# Patient Record
Sex: FEMALE | Race: OTHER | HISPANIC OR LATINO
[De-identification: names, ages, dates, MRNs, and addresses within clinical notes are randomized per-mention and may not be internally consistent; named-entity substitution may affect disease eponyms.]

---

## 2023-10-23 ENCOUNTER — LABORATORY RESULT (OUTPATIENT)
Age: 28
End: 2023-10-23

## 2023-10-24 ENCOUNTER — ASOB RESULT (OUTPATIENT)
Age: 28
End: 2023-10-24

## 2023-10-24 ENCOUNTER — APPOINTMENT (OUTPATIENT)
Dept: ANTEPARTUM | Facility: CLINIC | Age: 28
End: 2023-10-24
Payer: MEDICAID

## 2023-10-24 PROBLEM — Z00.00 ENCOUNTER FOR PREVENTIVE HEALTH EXAMINATION: Status: ACTIVE | Noted: 2023-10-24

## 2023-10-24 PROCEDURE — 76813 OB US NUCHAL MEAS 1 GEST: CPT

## 2023-10-24 PROCEDURE — 76801 OB US < 14 WKS SINGLE FETUS: CPT | Mod: 59

## 2023-11-07 RX ORDER — PNV/FERROUS SULFATE/FOLIC ACID 27-<0.5MG
TABLET ORAL
Refills: 0 | Status: ACTIVE | COMMUNITY

## 2023-11-07 RX ORDER — LABETALOL HYDROCHLORIDE 100 MG/1
100 TABLET, FILM COATED ORAL
Refills: 0 | Status: ACTIVE | COMMUNITY

## 2023-11-13 ENCOUNTER — APPOINTMENT (OUTPATIENT)
Dept: CARDIOLOGY | Facility: CLINIC | Age: 28
End: 2023-11-13
Payer: MEDICAID

## 2023-11-13 VITALS
OXYGEN SATURATION: 99 % | WEIGHT: 232 LBS | RESPIRATION RATE: 16 BRPM | HEART RATE: 100 BPM | SYSTOLIC BLOOD PRESSURE: 125 MMHG | DIASTOLIC BLOOD PRESSURE: 84 MMHG | TEMPERATURE: 98.1 F

## 2023-11-13 PROCEDURE — 93000 ELECTROCARDIOGRAM COMPLETE: CPT

## 2023-11-13 PROCEDURE — 99204 OFFICE O/P NEW MOD 45 MIN: CPT | Mod: 25

## 2023-11-13 RX ORDER — ASPIRIN ENTERIC COATED TABLETS 81 MG 81 MG/1
81 TABLET, DELAYED RELEASE ORAL
Qty: 90 | Refills: 3 | Status: ACTIVE | COMMUNITY
Start: 2023-11-13 | End: 1900-01-01

## 2023-12-13 ENCOUNTER — APPOINTMENT (OUTPATIENT)
Dept: ANTEPARTUM | Facility: CLINIC | Age: 28
End: 2023-12-13
Payer: MEDICAID

## 2023-12-13 ENCOUNTER — ASOB RESULT (OUTPATIENT)
Age: 28
End: 2023-12-13

## 2023-12-13 PROCEDURE — 76811 OB US DETAILED SNGL FETUS: CPT

## 2023-12-27 ENCOUNTER — ASOB RESULT (OUTPATIENT)
Age: 28
End: 2023-12-27

## 2023-12-27 ENCOUNTER — APPOINTMENT (OUTPATIENT)
Dept: ANTEPARTUM | Facility: CLINIC | Age: 28
End: 2023-12-27
Payer: MEDICAID

## 2023-12-27 PROCEDURE — 76816 OB US FOLLOW-UP PER FETUS: CPT

## 2024-01-29 ENCOUNTER — NON-APPOINTMENT (OUTPATIENT)
Age: 29
End: 2024-01-29

## 2024-01-29 ENCOUNTER — APPOINTMENT (OUTPATIENT)
Dept: CARDIOLOGY | Facility: CLINIC | Age: 29
End: 2024-01-29
Payer: MEDICAID

## 2024-01-29 VITALS
TEMPERATURE: 98 F | DIASTOLIC BLOOD PRESSURE: 89 MMHG | BODY MASS INDEX: 38.65 KG/M2 | HEIGHT: 65 IN | SYSTOLIC BLOOD PRESSURE: 136 MMHG | OXYGEN SATURATION: 95 % | WEIGHT: 232 LBS | HEART RATE: 102 BPM

## 2024-01-29 DIAGNOSIS — I10 ESSENTIAL (PRIMARY) HYPERTENSION: ICD-10-CM

## 2024-01-29 PROCEDURE — 93000 ELECTROCARDIOGRAM COMPLETE: CPT

## 2024-01-29 PROCEDURE — 99214 OFFICE O/P EST MOD 30 MIN: CPT | Mod: 25

## 2024-01-29 NOTE — DISCUSSION/SUMMARY
[FreeTextEntry1] : 28 year old woman  who is ~328 weeks gestation. (ARLYN 5/3/24) Continue Labetalol 100 mg BID Continue ASA Check TTE   [EKG obtained to assist in diagnosis and management of assessed problem(s)] : EKG obtained to assist in diagnosis and management of assessed problem(s)

## 2024-01-29 NOTE — HISTORY OF PRESENT ILLNESS
[FreeTextEntry1] : 28 year old woman  who is ~28 weeks gestation. (ARLYN 5/3/24)  She carries a history of HTN  She is being treated with Labetalol 100 mg BID  Yulissa [id number] ID

## 2024-02-07 ENCOUNTER — OUTPATIENT (OUTPATIENT)
Dept: OUTPATIENT SERVICES | Facility: HOSPITAL | Age: 29
LOS: 1 days | End: 2024-02-07
Payer: MEDICAID

## 2024-02-07 ENCOUNTER — APPOINTMENT (OUTPATIENT)
Dept: CV DIAGNOSITCS | Facility: HOSPITAL | Age: 29
End: 2024-02-07

## 2024-02-07 DIAGNOSIS — I10 ESSENTIAL (PRIMARY) HYPERTENSION: ICD-10-CM

## 2024-02-07 PROCEDURE — 93306 TTE W/DOPPLER COMPLETE: CPT | Mod: 26

## 2024-02-09 ENCOUNTER — APPOINTMENT (OUTPATIENT)
Dept: ANTEPARTUM | Facility: CLINIC | Age: 29
End: 2024-02-09
Payer: MEDICAID

## 2024-02-09 ENCOUNTER — ASOB RESULT (OUTPATIENT)
Age: 29
End: 2024-02-09

## 2024-02-09 PROCEDURE — 76816 OB US FOLLOW-UP PER FETUS: CPT

## 2024-02-09 PROCEDURE — 76819 FETAL BIOPHYS PROFIL W/O NST: CPT | Mod: 59

## 2024-02-13 PROCEDURE — 93010 ELECTROCARDIOGRAM REPORT: CPT

## 2024-03-08 ENCOUNTER — APPOINTMENT (OUTPATIENT)
Dept: ANTEPARTUM | Facility: CLINIC | Age: 29
End: 2024-03-08

## 2024-03-25 ENCOUNTER — APPOINTMENT (OUTPATIENT)
Dept: CARDIOLOGY | Facility: CLINIC | Age: 29
End: 2024-03-25

## 2024-03-25 ENCOUNTER — APPOINTMENT (OUTPATIENT)
Dept: ANTEPARTUM | Facility: CLINIC | Age: 29
End: 2024-03-25

## 2024-03-27 ENCOUNTER — APPOINTMENT (OUTPATIENT)
Dept: ANTEPARTUM | Facility: CLINIC | Age: 29
End: 2024-03-27
Payer: MEDICAID

## 2024-03-27 ENCOUNTER — ASOB RESULT (OUTPATIENT)
Age: 29
End: 2024-03-27

## 2024-03-27 PROCEDURE — 76819 FETAL BIOPHYS PROFIL W/O NST: CPT

## 2024-03-27 PROCEDURE — 76816 OB US FOLLOW-UP PER FETUS: CPT

## 2024-04-11 ENCOUNTER — APPOINTMENT (OUTPATIENT)
Dept: CV DIAGNOSITCS | Facility: HOSPITAL | Age: 29
End: 2024-04-11

## 2024-04-17 ENCOUNTER — APPOINTMENT (OUTPATIENT)
Dept: ANTEPARTUM | Facility: CLINIC | Age: 29
End: 2024-04-17

## 2024-04-17 ENCOUNTER — INPATIENT (INPATIENT)
Facility: HOSPITAL | Age: 29
LOS: 5 days | Discharge: HOME | End: 2024-04-23
Attending: STUDENT IN AN ORGANIZED HEALTH CARE EDUCATION/TRAINING PROGRAM | Admitting: STUDENT IN AN ORGANIZED HEALTH CARE EDUCATION/TRAINING PROGRAM
Payer: MEDICAID

## 2024-04-17 VITALS
WEIGHT: 266.1 LBS | RESPIRATION RATE: 16 BRPM | HEART RATE: 98 BPM | DIASTOLIC BLOOD PRESSURE: 68 MMHG | HEIGHT: 63 IN | SYSTOLIC BLOOD PRESSURE: 117 MMHG | TEMPERATURE: 98 F

## 2024-04-17 DIAGNOSIS — Z90.49 ACQUIRED ABSENCE OF OTHER SPECIFIED PARTS OF DIGESTIVE TRACT: Chronic | ICD-10-CM

## 2024-04-17 DIAGNOSIS — O26.899 OTHER SPECIFIED PREGNANCY RELATED CONDITIONS, UNSPECIFIED TRIMESTER: ICD-10-CM

## 2024-04-17 LAB
ALBUMIN SERPL ELPH-MCNC: 3.7 G/DL — SIGNIFICANT CHANGE UP (ref 3.3–5)
ALP SERPL-CCNC: 153 U/L — HIGH (ref 40–120)
ALT FLD-CCNC: 27 U/L — SIGNIFICANT CHANGE UP (ref 4–33)
ANION GAP SERPL CALC-SCNC: 14 MMOL/L — SIGNIFICANT CHANGE UP (ref 7–14)
APPEARANCE UR: ABNORMAL
AST SERPL-CCNC: 23 U/L — SIGNIFICANT CHANGE UP (ref 4–32)
BACTERIA # UR AUTO: ABNORMAL /HPF
BASOPHILS # BLD AUTO: 0.02 K/UL — SIGNIFICANT CHANGE UP (ref 0–0.2)
BASOPHILS NFR BLD AUTO: 0.3 % — SIGNIFICANT CHANGE UP (ref 0–2)
BILIRUB SERPL-MCNC: 0.4 MG/DL — SIGNIFICANT CHANGE UP (ref 0.2–1.2)
BILIRUB UR-MCNC: NEGATIVE — SIGNIFICANT CHANGE UP
BLD GP AB SCN SERPL QL: NEGATIVE — SIGNIFICANT CHANGE UP
BUN SERPL-MCNC: 7 MG/DL — SIGNIFICANT CHANGE UP (ref 7–23)
CALCIUM SERPL-MCNC: 8.9 MG/DL — SIGNIFICANT CHANGE UP (ref 8.4–10.5)
CAST: 2 /LPF — SIGNIFICANT CHANGE UP (ref 0–4)
CHLORIDE SERPL-SCNC: 105 MMOL/L — SIGNIFICANT CHANGE UP (ref 98–107)
CO2 SERPL-SCNC: 18 MMOL/L — LOW (ref 22–31)
COLOR SPEC: YELLOW — SIGNIFICANT CHANGE UP
CREAT ?TM UR-MCNC: 116 MG/DL — SIGNIFICANT CHANGE UP
CREAT SERPL-MCNC: 0.47 MG/DL — LOW (ref 0.5–1.3)
DIFF PNL FLD: NEGATIVE — SIGNIFICANT CHANGE UP
EGFR: 132 ML/MIN/1.73M2 — SIGNIFICANT CHANGE UP
EOSINOPHIL # BLD AUTO: 0.08 K/UL — SIGNIFICANT CHANGE UP (ref 0–0.5)
EOSINOPHIL NFR BLD AUTO: 1.1 % — SIGNIFICANT CHANGE UP (ref 0–6)
GLUCOSE SERPL-MCNC: 69 MG/DL — LOW (ref 70–99)
GLUCOSE UR QL: NEGATIVE MG/DL — SIGNIFICANT CHANGE UP
HCT VFR BLD CALC: 33.9 % — LOW (ref 34.5–45)
HGB BLD-MCNC: 11.4 G/DL — LOW (ref 11.5–15.5)
IANC: 4.76 K/UL — SIGNIFICANT CHANGE UP (ref 1.8–7.4)
IMM GRANULOCYTES NFR BLD AUTO: 1.4 % — HIGH (ref 0–0.9)
KETONES UR-MCNC: 15 MG/DL
LDH SERPL L TO P-CCNC: 206 U/L — SIGNIFICANT CHANGE UP (ref 135–225)
LEUKOCYTE ESTERASE UR-ACNC: ABNORMAL
LYMPHOCYTES # BLD AUTO: 1.69 K/UL — SIGNIFICANT CHANGE UP (ref 1–3.3)
LYMPHOCYTES # BLD AUTO: 23.4 % — SIGNIFICANT CHANGE UP (ref 13–44)
MCHC RBC-ENTMCNC: 31.5 PG — SIGNIFICANT CHANGE UP (ref 27–34)
MCHC RBC-ENTMCNC: 33.6 GM/DL — SIGNIFICANT CHANGE UP (ref 32–36)
MCV RBC AUTO: 93.6 FL — SIGNIFICANT CHANGE UP (ref 80–100)
MONOCYTES # BLD AUTO: 0.56 K/UL — SIGNIFICANT CHANGE UP (ref 0–0.9)
MONOCYTES NFR BLD AUTO: 7.8 % — SIGNIFICANT CHANGE UP (ref 2–14)
NEUTROPHILS # BLD AUTO: 4.76 K/UL — SIGNIFICANT CHANGE UP (ref 1.8–7.4)
NEUTROPHILS NFR BLD AUTO: 66 % — SIGNIFICANT CHANGE UP (ref 43–77)
NITRITE UR-MCNC: NEGATIVE — SIGNIFICANT CHANGE UP
NRBC # BLD: 0 /100 WBCS — SIGNIFICANT CHANGE UP (ref 0–0)
NRBC # FLD: 0 K/UL — SIGNIFICANT CHANGE UP (ref 0–0)
PH UR: 6.5 — SIGNIFICANT CHANGE UP (ref 5–8)
PLATELET # BLD AUTO: 163 K/UL — SIGNIFICANT CHANGE UP (ref 150–400)
POTASSIUM SERPL-MCNC: 3.8 MMOL/L — SIGNIFICANT CHANGE UP (ref 3.5–5.3)
POTASSIUM SERPL-SCNC: 3.8 MMOL/L — SIGNIFICANT CHANGE UP (ref 3.5–5.3)
PROT ?TM UR-MCNC: 21 MG/DL — SIGNIFICANT CHANGE UP
PROT SERPL-MCNC: 6.6 G/DL — SIGNIFICANT CHANGE UP (ref 6–8.3)
PROT UR-MCNC: NEGATIVE MG/DL — SIGNIFICANT CHANGE UP
PROT/CREAT UR-RTO: 0.2 RATIO — SIGNIFICANT CHANGE UP (ref 0–0.2)
RBC # BLD: 3.62 M/UL — LOW (ref 3.8–5.2)
RBC # FLD: 12.4 % — SIGNIFICANT CHANGE UP (ref 10.3–14.5)
RBC CASTS # UR COMP ASSIST: 1 /HPF — SIGNIFICANT CHANGE UP (ref 0–4)
RH IG SCN BLD-IMP: POSITIVE — SIGNIFICANT CHANGE UP
RH IG SCN BLD-IMP: POSITIVE — SIGNIFICANT CHANGE UP
SODIUM SERPL-SCNC: 137 MMOL/L — SIGNIFICANT CHANGE UP (ref 135–145)
SP GR SPEC: 1.02 — SIGNIFICANT CHANGE UP (ref 1–1.03)
SQUAMOUS # UR AUTO: 14 /HPF — HIGH (ref 0–5)
URATE SERPL-MCNC: 4.1 MG/DL — SIGNIFICANT CHANGE UP (ref 2.5–7)
UROBILINOGEN FLD QL: 1 MG/DL — SIGNIFICANT CHANGE UP (ref 0.2–1)
WBC # BLD: 7.21 K/UL — SIGNIFICANT CHANGE UP (ref 3.8–10.5)
WBC # FLD AUTO: 7.21 K/UL — SIGNIFICANT CHANGE UP (ref 3.8–10.5)
WBC UR QL: 9 /HPF — HIGH (ref 0–5)

## 2024-04-17 RX ORDER — OXYTOCIN 10 UNIT/ML
333.33 VIAL (ML) INJECTION
Qty: 20 | Refills: 0 | Status: DISCONTINUED | OUTPATIENT
Start: 2024-04-17 | End: 2024-04-20

## 2024-04-17 RX ORDER — LABETALOL HCL 100 MG
100 TABLET ORAL EVERY 12 HOURS
Refills: 0 | Status: DISCONTINUED | OUTPATIENT
Start: 2024-04-17 | End: 2024-04-23

## 2024-04-17 RX ORDER — CHLORHEXIDINE GLUCONATE 213 G/1000ML
1 SOLUTION TOPICAL DAILY
Refills: 0 | Status: DISCONTINUED | OUTPATIENT
Start: 2024-04-17 | End: 2024-04-20

## 2024-04-17 RX ORDER — SODIUM CHLORIDE 9 MG/ML
1000 INJECTION, SOLUTION INTRAVENOUS
Refills: 0 | Status: DISCONTINUED | OUTPATIENT
Start: 2024-04-17 | End: 2024-04-20

## 2024-04-17 RX ORDER — CITRIC ACID/SODIUM CITRATE 300-500 MG
15 SOLUTION, ORAL ORAL EVERY 6 HOURS
Refills: 0 | Status: DISCONTINUED | OUTPATIENT
Start: 2024-04-17 | End: 2024-04-20

## 2024-04-17 RX ORDER — AMPICILLIN TRIHYDRATE 250 MG
1 CAPSULE ORAL EVERY 4 HOURS
Refills: 0 | Status: DISCONTINUED | OUTPATIENT
Start: 2024-04-17 | End: 2024-04-20

## 2024-04-17 RX ORDER — AMPICILLIN TRIHYDRATE 250 MG
2 CAPSULE ORAL ONCE
Refills: 0 | Status: COMPLETED | OUTPATIENT
Start: 2024-04-17 | End: 2024-04-17

## 2024-04-17 RX ADMIN — CHLORHEXIDINE GLUCONATE 1 APPLICATION(S): 213 SOLUTION TOPICAL at 20:18

## 2024-04-17 RX ADMIN — SODIUM CHLORIDE 125 MILLILITER(S): 9 INJECTION, SOLUTION INTRAVENOUS at 20:18

## 2024-04-17 RX ADMIN — Medication 100 MILLIGRAM(S): at 22:03

## 2024-04-17 RX ADMIN — Medication 200 GRAM(S): at 20:17

## 2024-04-17 RX ADMIN — Medication 108 GRAM(S): at 23:50

## 2024-04-17 NOTE — OB PROVIDER H&P - ASSESSMENT
Assessment  29yoF  at 38w gestational age admitted for IOL for cHTN.    Plan  1. Admit to LND. Routine Routine labs with HELLP labs. IVF.  2. IOL w/ buccal cytotec and cervical balloon  3. Fetus: cat 1 tracing. VTX. EFW 3071g by sono. Continuous EFM. Sono. No concerns.  4. Prenatal issues: cHTN on Labetalol 100BID.  5. GBS pos for intrapartum ampicillin  6. Pain: epidural PRN  7. 2u on hold with 2 large bore IVs for BMI>40 and bASA in pregnancy.    Plan per attending physician, Dr. Julien Vick, PGY1 Assessment  29yoF  at 38w gestational age admitted for IOL for cHTN with siPEC without severe features.    Plan  1. Admit to LND. Routine Routine labs with HELLP labs. IVF.  2. IOL w/ buccal cytotec and cervical balloon  3. Fetus: cat 1 tracing. VTX. EFW 3071g by sono. Continuous EFM. Sono. No concerns.  4. Prenatal issues: cHTN on Labetalol 100BID.  5. GBS pos for intrapartum ampicillin  6. Pain: epidural PRN  7. 2u on hold with 2 large bore IVs for BMI>40 and bASA in pregnancy.    Plan per attending physician, Dr. Julien Vick, PGY1

## 2024-04-17 NOTE — OB PROVIDER H&P - NSLOWPPHRISK_OBGYN_A_OB
No previous uterine incision/[place] Pregnancy/Less than or equal to 4 previous vaginal births/No known bleeding disorder

## 2024-04-17 NOTE — OB RN PATIENT PROFILE - FALL HARM RISK - UNIVERSAL INTERVENTIONS
Bed in lowest position, wheels locked, appropriate side rails in place/Call bell, personal items and telephone in reach/Instruct patient to call for assistance before getting out of bed or chair/Non-slip footwear when patient is out of bed/[place] to call system/Physically safe environment - no spills, clutter or unnecessary equipment/Purposeful Proactive Rounding/Room/bathroom lighting operational, light cord in reach

## 2024-04-17 NOTE — OB RN PATIENT PROFILE - PRESSURE ULCER(S)
The patient needs to see Dr. Ortiz or Kevan since I have been seen since 2013 and do not feel comfortable addressing these medications   no

## 2024-04-17 NOTE — OB PROVIDER H&P - NSHPPHYSICALEXAM_GEN_ALL_CORE
Objective  – VS  T(C): 36.9 (04-17-24 @ 18:24)  HR: 98 (04-17-24 @ 18:27)  BP: 117/68 (04-17-24 @ 18:27)  RR: 16 (04-17-24 @ 18:24)  SpO2: --  – PE:   CV: RRR  Pulm: breathing comfortably on RA  Abd: gravid, nontender  Extr: moving all extremities with ease  – VE: 0/0/-3  – FHT: baseline 140, mod variability, +accels, -decels  – [place]: Acontractile  – EFW: 3071g by sono  – Sono: vertex

## 2024-04-17 NOTE — OB PROVIDER H&P - HISTORY OF PRESENT ILLNESS
R1 Admission H&P    Subjective  HPI: 29yoF  at 38w gestational age presents for IOL for cHTN. Takes _. Denies headaches, blurry vision, RUQ pain, edema.  Saw Cardio OB Dr. Lopes in pregnancy for cHTN. EKG shows sinus tachycardia with evidence of left axis deviation, however TTE was WNL EF 62%.  +FM. -LOF. -CTXs. -VB. Pt denies any other concerns.    – PNC: cHTN on _. GBS positive.  EFW _g by sono.  – OBHx:   – GynHx: denies ovarian cysts, fibroids, Hx of abnormal pap smears, Hx of STIs  – PMH: denies HTN, DM, asthma, cardiac disease, thyroid disorders, Hx of blood clots, bleeding disorders  – PSH: denies  – Psych: denies anxiety or depression  – Social: denies EtOH, smoking, recreational drug use  – Meds: PNV, bASA  – Allergies: NKDA  – Will accept blood transfusions? Yes    Objective  – VS  T(C): 36.9 (24 @ 18:24)  HR: 98 (24 @ 18:27)  BP: 117/68 (24 @ 18:27)  RR: 16 (24 @ 18:24)  SpO2: --  – PE:   CV: RRR  Pulm: breathing comfortably on RA  Abd: gravid, nontender  Extr: moving all extremities with ease  – VE: //  – FHT: baseline 140, mod variability, +accels, -decels  – [place]: Acontractile  – EFW: _g by sono  – Sono: vertex    Assessment  29yoF  at 38w gestational age admitted for IOL for cHTN.    Plan  1. Admit to LND. Routine Routine labs with HELLP labs. IVF.  2. IOL w/  3. Fetus: cat 1 tracing. VTX. EFW _g by sono. Continuous EFM. Sono. No concerns.  4. Prenatal issues: cHTN on _  5. GBS pos for intrapartum ampicillin  6. Pain: epidural PRN  7. 2u on hold with 2 large bore IVs for BMI>40 and bASA in pregnancy.    Plan per attending physician, Dr. Julien Vick, PGY1 R1 Admission H&P    Subjective  Interview conducted using  #_    HPI: 29yoF  at 38w gestational age presents for IOL for cHTN. Takes _. Denies headaches, blurry vision, RUQ pain, edema.  Saw Cardio OB Dr. Lopes in pregnancy for cHTN. EKG shows sinus tachycardia with evidence of left axis deviation, however TTE was WNL EF 62%.  +FM. -LOF. -CTXs. -VB. Pt denies any other concerns.    – PNC: cHTN on _. GBS positive.  EFW _g by sono.  – OBHx: SAB x2  – GynHx: denies ovarian cysts, fibroids, Hx of abnormal pap smears, Hx of STIs  – PMH: denies HTN, DM, asthma, cardiac disease, thyroid disorders, Hx of blood clots, bleeding disorders  – PSH: Appendectomy  – Psych: denies anxiety or depression  – Social: denies EtOH, smoking, recreational drug use  – Meds: PNV, bASA  – Allergies: NKDA  – Will accept blood transfusions? Yes    Objective  – VS  T(C): 36.9 (24 @ 18:24)  HR: 98 (24 @ 18:27)  BP: 117/68 (24 @ 18:27)  RR: 16 (24 @ 18:24)  SpO2: --  – PE:   CV: RRR  Pulm: breathing comfortably on RA  Abd: gravid, nontender  Extr: moving all extremities with ease  – VE: //  – FHT: baseline 140, mod variability, +accels, -decels  – [place]: Acontractile  – EFW: _g by sono  – Sono: vertex    Assessment  29yoF  at 38w gestational age admitted for IOL for cHTN.    Plan  1. Admit to LND. Routine Routine labs with HELLP labs. IVF.  2. IOL w/  3. Fetus: cat 1 tracing. VTX. EFW _g by sono. Continuous EFM. Sono. No concerns.  4. Prenatal issues: cHTN on _  5. GBS pos for intrapartum ampicillin  6. Pain: epidural PRN  7. 2u on hold with 2 large bore IVs for BMI>40 and bASA in pregnancy.    Plan per attending physician, Dr. Julien Vick, PGY1 R1 Admission H&P    Subjective  Interview conducted using  #[id number]    HPI: 29yoF  at 38w gestational age presents for IOL for cHTN. Takes Labetalol 100BID. 24hr urine recent >300. Denies headaches, blurry vision, RUQ pain, edema.  Saw Cardio OB Dr. Lopes in pregnancy for cHTN. EKG shows sinus tachycardia with evidence of left axis deviation, however TTE was WNL EF 62%.  +FM. -LOF. -CTXs. -VB. Pt denies any other concerns.    – PNC: cHTN on Labetalol 100mg BID. GBS positive.  EFW 3071g on  by sono.  – OBHx: SAB x2  – GynHx: denies ovarian cysts, fibroids, Hx of abnormal pap smears, Hx of STIs  – PMH: cHTN. Denies DM, asthma, cardiac disease, thyroid disorders, Hx of blood clots, bleeding disorders  – PSH: Appendectomy (2018)  – Psych: denies anxiety or depression  – Social: denies EtOH, smoking, recreational drug use  – Meds: PNV, bASA  – Allergies: NKDA  – Will accept blood transfusions? 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 Recent 24hr urine was 360. Blood pressures at home have been well controlled on Labetalol 100BID. Denies headaches, blurry vision, RUQ pain, edema.  Saw Cardio OB Dr. Lopes in pregnancy for cHTN. EKG shows sinus tachycardia with evidence of left axis deviation, however TTE was WNL EF 62%.  +FM. -LOF. -CTXs. -VB. Pt denies any other concerns.    – PNC: cHTN with siPEC without severe features on Labetalol 100mg BID. GBS positive.  EFW 3071g on  by humberto.  – OBHx: SAB x2  – GynHx: denies ovarian cysts, fibroids, Hx of abnormal pap smears, Hx of STIs  – PMH: cHTN. Denies DM, asthma, cardiac disease, thyroid disorders, Hx of blood clots, bleeding disorders  – PSH: Appendectomy (2018)  – Psych: denies anxiety or depression  – Social: denies EtOH, smoking, recreational drug use  – Meds: PNV, bASA  – Allergies: NKDA  – Will accept blood transfusions? Yes R1 Admission H&P    Subjective  Interview conducted using  #[id number]    HPI: 29yoF  at 38w gestational age presents for IOL for cHTN with siPEC without severe features. Recent 24hr urine was 364 (). Blood pressures at home have been well controlled on Labetalol 100BID. Denies headaches, blurry vision, RUQ pain, edema.  Saw Cardio OB Dr. Lopes in pregnancy for cHTN. EKG shows sinus tachycardia with evidence of left axis deviation, however TTE was WNL EF 62%.  +FM. -LOF. -CTXs. -VB. Pt denies any other concerns.    – PNC: cHTN with siPEC without severe features on Labetalol 100mg BID. GBS positive.  EFW 3071g on  by humberto.  – OBHx: SAB x2  – GynHx: denies ovarian cysts, fibroids, Hx of abnormal pap smears, Hx of STIs  – PMH: cHTN. Denies DM, asthma, cardiac disease, thyroid disorders, Hx of blood clots, bleeding disorders  – PSH: Appendectomy (2018)  – Psych: denies anxiety or depression  – Social: denies EtOH, smoking, recreational drug use  – Meds: PNV, bASA  – Allergies: NKDA  – Will accept blood transfusions? Yes

## 2024-04-18 DIAGNOSIS — O10.013 PRE-EXISTING ESSENTIAL HYPERTENSION COMPLICATING PREGNANCY, THIRD TRIMESTER: ICD-10-CM

## 2024-04-18 LAB — T PALLIDUM AB TITR SER: NEGATIVE — SIGNIFICANT CHANGE UP

## 2024-04-18 RX ADMIN — CHLORHEXIDINE GLUCONATE 1 APPLICATION(S): 213 SOLUTION TOPICAL at 15:02

## 2024-04-18 RX ADMIN — Medication 100 MILLIGRAM(S): at 10:17

## 2024-04-18 RX ADMIN — Medication 108 GRAM(S): at 12:02

## 2024-04-18 RX ADMIN — SODIUM CHLORIDE 125 MILLILITER(S): 9 INJECTION, SOLUTION INTRAVENOUS at 08:14

## 2024-04-18 RX ADMIN — Medication 100 MILLIGRAM(S): at 22:20

## 2024-04-18 RX ADMIN — Medication 108 GRAM(S): at 20:07

## 2024-04-18 RX ADMIN — Medication 108 GRAM(S): at 04:17

## 2024-04-18 RX ADMIN — Medication 108 GRAM(S): at 08:00

## 2024-04-18 RX ADMIN — Medication 108 GRAM(S): at 15:59

## 2024-04-18 NOTE — OB PROVIDER LABOR PROGRESS NOTE - ASSESSMENT
Plan: 29y y/o @ 38w1d in stable condition  - Con't IOL, CB placed without issue  - continue Buccal cytotec  - Continuous EFM, [place]  - Con't IVF    d/w attending physician Dr. Jackson Mcgrath MD, PGY1

## 2024-04-18 NOTE — OB PROVIDER LABOR PROGRESS NOTE - ASSESSMENT
- Cont IOL w/ buccal cytotec  - Reexamine after next dose   - Cont EFM, toco   - Anticipate      D/w Dr. Jackson Glass PGY-1

## 2024-04-19 ENCOUNTER — TRANSCRIPTION ENCOUNTER (OUTPATIENT)
Age: 29
End: 2024-04-19

## 2024-04-19 LAB
ALBUMIN SERPL ELPH-MCNC: 3.3 G/DL — SIGNIFICANT CHANGE UP (ref 3.3–5)
ALP SERPL-CCNC: 147 U/L — HIGH (ref 40–120)
ALT FLD-CCNC: 27 U/L — SIGNIFICANT CHANGE UP (ref 4–33)
ANION GAP SERPL CALC-SCNC: 15 MMOL/L — HIGH (ref 7–14)
AST SERPL-CCNC: 21 U/L — SIGNIFICANT CHANGE UP (ref 4–32)
BASOPHILS # BLD AUTO: 0.02 K/UL — SIGNIFICANT CHANGE UP (ref 0–0.2)
BASOPHILS NFR BLD AUTO: 0.2 % — SIGNIFICANT CHANGE UP (ref 0–2)
BILIRUB SERPL-MCNC: 1.2 MG/DL — SIGNIFICANT CHANGE UP (ref 0.2–1.2)
BUN SERPL-MCNC: 5 MG/DL — LOW (ref 7–23)
CALCIUM SERPL-MCNC: 8.5 MG/DL — SIGNIFICANT CHANGE UP (ref 8.4–10.5)
CHLORIDE SERPL-SCNC: 103 MMOL/L — SIGNIFICANT CHANGE UP (ref 98–107)
CO2 SERPL-SCNC: 17 MMOL/L — LOW (ref 22–31)
CREAT SERPL-MCNC: 0.47 MG/DL — LOW (ref 0.5–1.3)
EGFR: 132 ML/MIN/1.73M2 — SIGNIFICANT CHANGE UP
EOSINOPHIL # BLD AUTO: 0.05 K/UL — SIGNIFICANT CHANGE UP (ref 0–0.5)
EOSINOPHIL NFR BLD AUTO: 0.6 % — SIGNIFICANT CHANGE UP (ref 0–6)
GLUCOSE SERPL-MCNC: 80 MG/DL — SIGNIFICANT CHANGE UP (ref 70–99)
HCT VFR BLD CALC: 31.5 % — LOW (ref 34.5–45)
HGB BLD-MCNC: 10.5 G/DL — LOW (ref 11.5–15.5)
IANC: 6.07 K/UL — SIGNIFICANT CHANGE UP (ref 1.8–7.4)
IMM GRANULOCYTES NFR BLD AUTO: 1 % — HIGH (ref 0–0.9)
LDH SERPL L TO P-CCNC: 178 U/L — SIGNIFICANT CHANGE UP (ref 135–225)
LYMPHOCYTES # BLD AUTO: 1.56 K/UL — SIGNIFICANT CHANGE UP (ref 1–3.3)
LYMPHOCYTES # BLD AUTO: 18.6 % — SIGNIFICANT CHANGE UP (ref 13–44)
MCHC RBC-ENTMCNC: 30.6 PG — SIGNIFICANT CHANGE UP (ref 27–34)
MCHC RBC-ENTMCNC: 33.3 GM/DL — SIGNIFICANT CHANGE UP (ref 32–36)
MCV RBC AUTO: 91.8 FL — SIGNIFICANT CHANGE UP (ref 80–100)
MONOCYTES # BLD AUTO: 0.62 K/UL — SIGNIFICANT CHANGE UP (ref 0–0.9)
MONOCYTES NFR BLD AUTO: 7.4 % — SIGNIFICANT CHANGE UP (ref 2–14)
NEUTROPHILS # BLD AUTO: 6.07 K/UL — SIGNIFICANT CHANGE UP (ref 1.8–7.4)
NEUTROPHILS NFR BLD AUTO: 72.2 % — SIGNIFICANT CHANGE UP (ref 43–77)
NRBC # BLD: 0 /100 WBCS — SIGNIFICANT CHANGE UP (ref 0–0)
NRBC # FLD: 0 K/UL — SIGNIFICANT CHANGE UP (ref 0–0)
PLATELET # BLD AUTO: 158 K/UL — SIGNIFICANT CHANGE UP (ref 150–400)
POTASSIUM SERPL-MCNC: 3.4 MMOL/L — LOW (ref 3.5–5.3)
POTASSIUM SERPL-SCNC: 3.4 MMOL/L — LOW (ref 3.5–5.3)
PROT SERPL-MCNC: 5.9 G/DL — LOW (ref 6–8.3)
RBC # BLD: 3.43 M/UL — LOW (ref 3.8–5.2)
RBC # FLD: 12.3 % — SIGNIFICANT CHANGE UP (ref 10.3–14.5)
SODIUM SERPL-SCNC: 135 MMOL/L — SIGNIFICANT CHANGE UP (ref 135–145)
URATE SERPL-MCNC: 5.4 MG/DL — SIGNIFICANT CHANGE UP (ref 2.5–7)
WBC # BLD: 8.4 K/UL — SIGNIFICANT CHANGE UP (ref 3.8–10.5)
WBC # FLD AUTO: 8.4 K/UL — SIGNIFICANT CHANGE UP (ref 3.8–10.5)

## 2024-04-19 RX ORDER — LABETALOL HCL 100 MG
1 TABLET ORAL
Refills: 0 | DISCHARGE

## 2024-04-19 RX ORDER — OXYTOCIN 10 UNIT/ML
2 VIAL (ML) INJECTION
Qty: 30 | Refills: 0 | Status: DISCONTINUED | OUTPATIENT
Start: 2024-04-19 | End: 2024-04-20

## 2024-04-19 RX ADMIN — Medication 108 GRAM(S): at 04:13

## 2024-04-19 RX ADMIN — Medication 108 GRAM(S): at 12:25

## 2024-04-19 RX ADMIN — Medication 108 GRAM(S): at 16:01

## 2024-04-19 RX ADMIN — Medication 100 MILLIGRAM(S): at 10:06

## 2024-04-19 RX ADMIN — CHLORHEXIDINE GLUCONATE 1 APPLICATION(S): 213 SOLUTION TOPICAL at 14:40

## 2024-04-19 RX ADMIN — SODIUM CHLORIDE 125 MILLILITER(S): 9 INJECTION, SOLUTION INTRAVENOUS at 22:20

## 2024-04-19 RX ADMIN — Medication 2 MILLIUNIT(S)/MIN: at 08:15

## 2024-04-19 RX ADMIN — Medication 100 MILLIGRAM(S): at 22:19

## 2024-04-19 RX ADMIN — Medication 108 GRAM(S): at 20:15

## 2024-04-19 RX ADMIN — Medication 108 GRAM(S): at 07:57

## 2024-04-19 RX ADMIN — Medication 108 GRAM(S): at 00:06

## 2024-04-19 RX ADMIN — SODIUM CHLORIDE 125 MILLILITER(S): 9 INJECTION, SOLUTION INTRAVENOUS at 12:25

## 2024-04-19 NOTE — OB PROVIDER LABOR PROGRESS NOTE - NS_SUBJECTIVE/OBJECTIVE_OBGYN_ALL_OB_FT
Patient now comfortable with epidural infusing   FHTs cat 1 with contractions noted q 2-3 mins  mvu 170    CX: 4-5/90/-2    Will continue present mng with optimizing pitocin
Patient seen and examined for cervical change     FHTs cat 1 with contractions q 2-3 mins  CX: 6/90/-2    pit at 20 mU    continue present mng with pitocin  position for comfort
Patient seen and examined for plan of care    FHTs CAT1 with difficulty due to habitus for continuous tracing and contractions    CX: 4/50/-3  AROM of clear fluid    IUPC and ISE placed    37.5 weeks IOL for CHTN w/ siPEC  -will change to pitocin   -discussed epidural when needed
VE performed to while placing next dose of vaginal cytotec. Patient is comfortable.
PGY1 Labor & Delivery Progress Note     Pt seen & examined for updated VE after CB came out    SVE: 3.5/50/-3  EFM: 145/min-mod. variability/+accels/-decels  [place] at [place]: Irregularly q3-7 min    T(C): 37.1 (04-18-24 @ 16:06), Max: 37.1 (04-18-24 @ 16:06)  HR: 85 (04-18-24 @ 16:05) (83 - 112)  BP: 102/60 (04-18-24 @ 16:05) (87/53 - 137/74)  RR: 15 (04-18-24 @ 16:06) (14 - 18)
VE performed. Unchanged exam. Next dose of vaginal cytotec given.
R1 Labor & Delivery Progress Note       Pt seen & examined at bedside for cervical balloon placement.           T(C): 36.8 (04-18-24 @ 10:05), Max: 36.9 (04-17-24 @ 18:16)  HR: 87 (04-18-24 @ 10:06) (83 - 112)  BP: 116/69 (04-18-24 @ 10:06) (87/53 - 137/74)  RR: 18 (04-18-24 @ 10:05) (15 - 18)  SpO2: --
Patient has completed course of buccal cytotec. VE performed. VE unchanged. Head very high and not well applied to cervix.

## 2024-04-19 NOTE — OB PROVIDER LABOR PROGRESS NOTE - NS_OBIHIFHRDETAILS_OBGYN_ALL_OB_FT
140bpm, mod elsa, +accels, -decels
Cat 1 tracing
130 mod elsa, +accels, -decels
EFM: 140s/mod. variability/+accels/-decels

## 2024-04-19 NOTE — OB PROVIDER LABOR PROGRESS NOTE - ASSESSMENT
Cat 1 tracing  Continue vaginal cytotec  Cont EFM/TOCO    Dr. aMrgo Vick, PGY-1 Cat 1 tracing  Continue vaginal cytotec  Cont EFM/TOCO    D/w Dr. Margo Vick, PGY-1

## 2024-04-20 LAB
RUBV IGG SER-ACNC: 2.6 INDEX — SIGNIFICANT CHANGE UP
RUBV IGG SER-IMP: POSITIVE — SIGNIFICANT CHANGE UP

## 2024-04-20 PROCEDURE — 88307 TISSUE EXAM BY PATHOLOGIST: CPT | Mod: 26

## 2024-04-20 DEVICE — SURGICEL FIBRILLAR 1 X 2": Type: IMPLANTABLE DEVICE | Status: FUNCTIONAL

## 2024-04-20 RX ORDER — DIPHENHYDRAMINE HCL 50 MG
25 CAPSULE ORAL EVERY 6 HOURS
Refills: 0 | Status: DISCONTINUED | OUTPATIENT
Start: 2024-04-20 | End: 2024-04-23

## 2024-04-20 RX ORDER — SODIUM CHLORIDE 9 MG/ML
500 INJECTION, SOLUTION INTRAVENOUS ONCE
Refills: 0 | Status: COMPLETED | OUTPATIENT
Start: 2024-04-20 | End: 2024-04-20

## 2024-04-20 RX ORDER — SODIUM CHLORIDE 9 MG/ML
1000 INJECTION, SOLUTION INTRAVENOUS
Refills: 0 | Status: DISCONTINUED | OUTPATIENT
Start: 2024-04-20 | End: 2024-04-21

## 2024-04-20 RX ORDER — OXYCODONE HYDROCHLORIDE 5 MG/1
5 TABLET ORAL ONCE
Refills: 0 | Status: DISCONTINUED | OUTPATIENT
Start: 2024-04-20 | End: 2024-04-23

## 2024-04-20 RX ORDER — TETANUS TOXOID, REDUCED DIPHTHERIA TOXOID AND ACELLULAR PERTUSSIS VACCINE, ADSORBED 5; 2.5; 8; 8; 2.5 [IU]/.5ML; [IU]/.5ML; UG/.5ML; UG/.5ML; UG/.5ML
0.5 SUSPENSION INTRAMUSCULAR ONCE
Refills: 0 | Status: DISCONTINUED | OUTPATIENT
Start: 2024-04-20 | End: 2024-04-23

## 2024-04-20 RX ORDER — SIMETHICONE 80 MG/1
80 TABLET, CHEWABLE ORAL EVERY 4 HOURS
Refills: 0 | Status: DISCONTINUED | OUTPATIENT
Start: 2024-04-20 | End: 2024-04-23

## 2024-04-20 RX ORDER — MAGNESIUM HYDROXIDE 400 MG/1
30 TABLET, CHEWABLE ORAL
Refills: 0 | Status: DISCONTINUED | OUTPATIENT
Start: 2024-04-20 | End: 2024-04-23

## 2024-04-20 RX ORDER — LANOLIN
1 OINTMENT (GRAM) TOPICAL EVERY 6 HOURS
Refills: 0 | Status: DISCONTINUED | OUTPATIENT
Start: 2024-04-20 | End: 2024-04-23

## 2024-04-20 RX ORDER — SODIUM CHLORIDE 9 MG/ML
1000 INJECTION, SOLUTION INTRAVENOUS ONCE
Refills: 0 | Status: COMPLETED | OUTPATIENT
Start: 2024-04-20 | End: 2024-04-20

## 2024-04-20 RX ORDER — HEPARIN SODIUM 5000 [USP'U]/ML
5000 INJECTION INTRAVENOUS; SUBCUTANEOUS EVERY 12 HOURS
Refills: 0 | Status: DISCONTINUED | OUTPATIENT
Start: 2024-04-20 | End: 2024-04-23

## 2024-04-20 RX ORDER — IBUPROFEN 200 MG
600 TABLET ORAL EVERY 6 HOURS
Refills: 0 | Status: COMPLETED | OUTPATIENT
Start: 2024-04-20 | End: 2025-03-19

## 2024-04-20 RX ORDER — OXYCODONE HYDROCHLORIDE 5 MG/1
5 TABLET ORAL
Refills: 0 | Status: DISCONTINUED | OUTPATIENT
Start: 2024-04-20 | End: 2024-04-23

## 2024-04-20 RX ORDER — OXYTOCIN 10 UNIT/ML
333.33 VIAL (ML) INJECTION
Qty: 20 | Refills: 0 | Status: DISCONTINUED | OUTPATIENT
Start: 2024-04-20 | End: 2024-04-21

## 2024-04-20 RX ORDER — KETOROLAC TROMETHAMINE 30 MG/ML
30 SYRINGE (ML) INJECTION EVERY 6 HOURS
Refills: 0 | Status: COMPLETED | OUTPATIENT
Start: 2024-04-20 | End: 2024-04-21

## 2024-04-20 RX ORDER — ACETAMINOPHEN 500 MG
975 TABLET ORAL
Refills: 0 | Status: DISCONTINUED | OUTPATIENT
Start: 2024-04-20 | End: 2024-04-23

## 2024-04-20 RX ADMIN — Medication 975 MILLIGRAM(S): at 21:11

## 2024-04-20 RX ADMIN — SODIUM CHLORIDE 1000 MILLILITER(S): 9 INJECTION, SOLUTION INTRAVENOUS at 03:17

## 2024-04-20 RX ADMIN — Medication 108 GRAM(S): at 05:26

## 2024-04-20 RX ADMIN — Medication 975 MILLIGRAM(S): at 22:10

## 2024-04-20 RX ADMIN — CHLORHEXIDINE GLUCONATE 1 APPLICATION(S): 213 SOLUTION TOPICAL at 10:30

## 2024-04-20 RX ADMIN — Medication 100 MILLIGRAM(S): at 21:11

## 2024-04-20 RX ADMIN — SODIUM CHLORIDE 1000 MILLILITER(S): 9 INJECTION, SOLUTION INTRAVENOUS at 03:20

## 2024-04-20 RX ADMIN — Medication 108 GRAM(S): at 01:31

## 2024-04-20 NOTE — DISCHARGE NOTE OB - CARE PLAN
1 Principal Discharge DX:	S/P primary low transverse   Assessment and plan of treatment:	normal  and postoperative care  Secondary Diagnosis:	Mild preeclampsia

## 2024-04-20 NOTE — CHART NOTE - NSCHARTNOTESELECT_GEN_ALL_CORE
Labor Note
R4 Chart Note
Event Note
Labor Note
R4 PTA Note

## 2024-04-20 NOTE — OB RN INTRAOPERATIVE NOTE - NSSELHIDDEN_OBGYN_ALL_OB_FT
[NS_DeliveryAttending1_OBGYN_ALL_OB_FT:[id number]],[NS_DeliveryAttending2_OBGYN_ALL_OB_FT:[id number]],[NS_DeliveryRN_OBGYN_ALL_OB_FT:[id number]=]

## 2024-04-20 NOTE — DISCHARGE NOTE OB - NS AS DC STROKE DX YN
----- Message from Dedra Tobar sent at 4/19/2017  9:51 AM CDT -----  Please call pt back at [phone] concerning surgery.    no

## 2024-04-20 NOTE — OB PROVIDER DELIVERY SUMMARY - NSPROVIDERDELIVERYNOTE_OBGYN_ALL_OB_FT
Patient fully dilated and pushing for >2hrs with no descent of fetal head. Unscheduled primary LTCS. Decision made to assist with kiwi vacuum, one pull and one pop off experienced. Decision made not to replace kiwi for a second pull. Second attending called into OR for assistance. Fetal head and body then delivered, viable male infant, vertex presentation, Apgars 3/7, cord gasses sent. Grossly normal fallopian tubes, uterus, and ovaries. Uterus closed in 1 layer with Vicryl. Small left sided extension repaired in standard fashion. Surgicel powder placed over hysterotomy. Fascia closed with Vicryl. Subcutaneous tissue reapproximated with 2-0 plain gut. Skin closed with monocryl. Dermabond and aquaseal bandage placed.    QBL: 605  IVF: 2000  UOP: 250    Dictation #    Kwan Mahmood, PGY 2  w/ attending, Dr. Bullard Patient fully dilated and pushing for >2hrs with no descent of fetal head. Unscheduled primary LTCS. Decision made to assist with kiwi vacuum, one pull and one pop off experienced. Decision made not to replace kiwi for a second pull. Second attending called into OR for assistance. Fetal head and body then delivered, viable male infant, vertex presentation, Apgars 3/7, cord gasses sent. Grossly normal fallopian tubes, uterus, and ovaries. Uterus closed in 1 layer with Vicryl. Small left sided extension repaired in standard fashion. Surgicel powder placed over hysterotomy. Fascia closed with Vicryl. Subcutaneous tissue reapproximated with 2-0 plain gut. Skin closed with monocryl. Dermabond and aquaseal bandage placed.    QBL: 605  IVF: 2000  UOP: 250    Dictation #[id number]    Kwan Mahmood, PGY 2  w/ attending, Dr. Bullard

## 2024-04-20 NOTE — DISCHARGE NOTE OB - CARE PROVIDERS DIRECT ADDRESSES
[email or web address]

## 2024-04-20 NOTE — DISCHARGE NOTE OB - MEDICATION SUMMARY - MEDICATIONS TO TAKE
I will START or STAY ON the medications listed below when I get home from the hospital:    ibuprofen 600 mg oral tablet  -- 1 tab(s) by mouth every 6 hours  -- Indication: For S/P primary low transverse     acetaminophen 325 mg oral tablet  -- 3 tab(s) by mouth 3 times a day as needed for  moderate pain  -- Indication: For S/P primary low transverse     labetalol 100 mg oral tablet  -- 1 tab(s) by mouth 2 times a day  -- Indication: For Mild preeclampsia    Prenatal Multivitamins oral tablet  -- orally  -- Indication: For S/P primary low transverse

## 2024-04-20 NOTE — OB RN DELIVERY SUMMARY - BABY A: WEIGHT IN OUNCES (FROM GRAMS), DELIVERY
Left voicemail letting patient know to go get potassium checked next week at [facility]. Faxed order to [facility].   8

## 2024-04-20 NOTE — DISCHARGE NOTE OB - NS MD DC FALL RISK RISK
For information on Fall & Injury Prevention, visit: [email or web address] OR  [email or web address] OR  https://www.cdc.gov/steadi/patient.html

## 2024-04-20 NOTE — OB PROVIDER DELIVERY SUMMARY - NSSELHIDDEN_OBGYN_ALL_OB_FT
[NS_DeliveryAttending1_OBGYN_ALL_OB_FT:[id number]],[NS_DeliveryAttending2_OBGYN_ALL_OB_FT:[id number]],[NS_DeliveryRN_OBGYN_ALL_OB_FT:[id number]=]

## 2024-04-20 NOTE — OB PROVIDER DELIVERY SUMMARY - NS_DELIVERYANESTHES_OBGYN_ALL_OB_FT
Impression: Age-related nuclear cataract, bilateral Plan: No treatment currently recommended due to level of vision. Patient will monitor vision changes and contact us with any decrease in vision, will re-evaluate cataract on return visit. MD Mcfarland

## 2024-04-20 NOTE — DISCHARGE NOTE OB - CARE PROVIDER_API CALL
Jorge Bullard  Obstetrics and Gynecology  [address] [place] [address], [address] [place] [id number]  Phone: [phone]  Fax: [phone]  Follow Up Time: 1-3 days

## 2024-04-20 NOTE — CHART NOTE - NSCHARTNOTEFT_GEN_A_CORE
Patient examined at bedside, fully dilated/0 station with +caput. Directed to push with contractions. Moderate variability, +accels. Continue to push with nursing. Pitocin on 14mu.
Patient seen and examined at bedside. VE: 6.5/90/-2, not a significant change from previous exam. Contractions now adequate, pitocin at 28mu. Tracing Cat 1. Patient c/o pain on right side > left side. Seen by anesthesia, epidural in place, patient had declined top off. Discussed options with patient, recommend trying top off to see if she has relief, otherwise may need epidural reevaluated. Patient agrees for top off, anesthesia to be notified.    Discussed with patient and family that if no change on next exam in 2 hours, would likely recommend  delivery for arrest.
Patient seen and examined at bedside. VE: 7.5/90/-2, IUPC in place. Discussed with patient option of continuing induction given change in cervical exam. Patient would like to continue. Plan to reexamine in 2 hours. Urine output last hour has been low, urine appears blood tinged and concentrated. 500cc bolus to be given. Tracing Cat 1. Continue pitocin at 30mu.
R4 PTA Note    PTA held for pushing x2h. Patient fully dilated x3 hours. Sono performed and fetus direct OP. Significant caput noted on exam. MD De Luna unable to manually rotate. Patient to be repositioned to encourage rotation. Will reeval in 30-60min.     EFM Cat 1     Karen Her MD PGY4   Dr. Bullard, Dr. Dutton, Dr. De Luna, RN Parrish, HUSSEIN Mason at bedside
Tracing reviewed - late decelerations with moderate variability, noted. Pitocin initially decreased to 20mu, then 10mu. Position changed to right lateral, then high fowlers. Will continue to monitor closely. If Cat 2 tracing continues, will stop pitocin.
 #[id number]    Patient seen and examined at bedside. VE: 6.5/80/-2, IUPC in place. Unchanged from prior. Discussed with patient no significant change in exam since about 5pm, despite pitocin of 30mu. Possible small change from 6-6.5cm. Contractions were previously adequate but are no longer adequate, q2 min. Tracing Cat 1. Discussed with patient  likely arrest of labor. We are unable to increase pitocin further at this time. Discussed increased hemorrhage risk with prolonged induction/prolonged high dose pitocin. Has additional risk factor for hemorrhage of high BMI. Discussed  delivery for arrest of labor at this time. Patient declines  delivery at this time and is opting to be reexamined in 1 hour. If no change in exam at that time, patient says she will agree for  delivery.      delivery risks reviewed with patient - reviewed risks of bleeding, infection, injury to surrounding organs, DVT/PE, hysterectomy, death. Patient understands risks and will agree for  delivery if no change after 1 hour. Understands that if there is change in the FHR tracing,  may be needed more urgently before that time.     Plan to recheck at 1:10am.
Patient seen and examined at bedside. Trial of pushing with nursing. On my exam, anterior lip still felt, unable to be reduced while pushing. Head at 0 station. Will stop pushing for now, allow to continue to labor down. Resume pushing once fully dilated, recheck in 30-45 min. Pitocin continues at 30mu.
Patient seen and examined at bedside. VE: 6/90/-2, unchanged from prior. IUPC in place. Pitocin currently at 28. Contractions q2 minutes. Tracing reviewed - Cat 1. Can increase pitocin to 30mu but will not go above 30. Recommend repositioning in left lateral with peanut ball.
R4 Chart Note     Patient seen at bedside. Have been repositioning to encourage rotation of fetus following earlier sono showing direct OP positioning. Sono repeated, fetus remains OP and high in the pelvis. Per Dr. Bullard, patient to attempt pushing for short time. Likely will require  section if no change in descent with pushing.     Karen Her MD PGY4   Dr. Dutton and Dr. Bullard at bedside

## 2024-04-20 NOTE — OB NEONATOLOGY/PEDIATRICIAN DELIVERY SUMMARY - NSPEDSNEONOTESA_OBGYN_ALL_OB_FT
Baby is a 38.3 wk GA male born to a 30 y/o  mother via unscheduled C/S for arrest. PEDS called to delivery for category 2 tracing. Maternal history of cHTN. Prenatal history of siPEC without severe features. Maternal blood type A+. PNL negative, non-reactive, and immune. GBS positive treated with ampicillin x16. AROM at 07:27 on , clear fluids. Baby born with poor tone, color and weak cry. Warmed, dried, stimulated and suctioned. PPV initiated at 1 minute and 30 seconds of life due to secondary apnea. PPV provided until 4 minutes of life with maximum settings of 30/5 with FiO2 of 100%. Baby with spontaneous respirations at 4 minutes of life and transitioned to CPAP. Apgars 3/7. EOS 0.15 (moms highest temp 37C, 29 hours ROM, GBS +, GBS specific abx). Mom plans to breastfeed/bottlefeed and declines hepB. Circ declined. Baby transferred to NICU in stable condition on bubble CPAP 5/30%.  BW: Pending  :   TOB: 11:30 Baby is a 38.3 wk GA male born to a 28 y/o  mother via unscheduled C/S for arrest. PEDS called to delivery for category 2 tracing. Maternal history of cHTN. Prenatal history of siPEC without severe features. Maternal blood type A+. PNL negative, non-reactive, and immune. GBS positive treated with ampicillin x16. AROM at 07:27 on , clear fluids. Difficult extraction. Attempted vacuum with pop offx1. Baby born with poor tone, color and weak cry. Warmed, dried, stimulated and suctioned. PPV initiated at 1 minute and 30 seconds of life due to secondary apnea. PPV provided until 4 minutes of life with maximum settings of 30/5 with FiO2 of 100%. Baby with spontaneous respirations at 4 minutes of life and transitioned to CPAP. Apgars 3/7. EOS 0.15 (moms highest temp 37C, 29 hours ROM, GBS +, GBS specific abx). Mom plans to breastfeed/bottlefeed and declines hepB. Circ declined. Baby transferred to NICU in stable condition on bubble CPAP 5/30%.  BW: Pending  :   TOB: 11:30

## 2024-04-20 NOTE — OB RN DELIVERY SUMMARY - NS_SEPSISRSKCALC_OBGYN_ALL_OB_FT
EOS calculated successfully. EOS Risk Factor: 0.5/1000 live births ([facility] national incidence); GA=38w3d; Temp=98.78; ROM=28.05; GBS='Positive'; Antibiotics='GBS specific antibiotics > 2 hrs prior to birth'

## 2024-04-20 NOTE — OB PROVIDER DELIVERY SUMMARY - NS_BIRTHTRAUMAA_OBGYN_ALL_OB
I was notified by the staff that the patient was unhappy and being verbally aggressive 
toward staff. The team did not request my assistance at that time. Will continue to check in 
with staff for administration, support, and de-escalation as needed. Cranial Trauma

## 2024-04-20 NOTE — OB RN DELIVERY SUMMARY - NSSELHIDDEN_OBGYN_ALL_OB_FT
[NS_DeliveryAttending1_OBGYN_ALL_OB_FT:[id number]],[NS_DeliveryAttending2_OBGYN_ALL_OB_FT:[id number]],[NS_DeliveryRN_OBGYN_ALL_OB_FT:[id number]]

## 2024-04-20 NOTE — PROCEDURAL SAFETY CHECKLIST WITH OR WITHOUT SEDATION - NSTEAMATTENDINGFT_GEN_ALL_CORE
MD Bullard
You can access the FollowMyHealth Patient Portal offered by [facility] by registering at the following website: [email or web address]. By joining [employer]’s FollowMyHealth portal, you will also be able to view your health information using other applications (apps) compatible with our system.

## 2024-04-20 NOTE — DISCHARGE NOTE OB - PATIENT PORTAL LINK FT
You can access the FollowMyHealth Patient Portal offered by [facility] by registering at the following website: [email or web address]. By joining [employer]’s FollowMyHealth portal, you will also be able to view your health information using other applications (apps) compatible with our system.

## 2024-04-21 LAB
BASOPHILS # BLD AUTO: 0.02 K/UL — SIGNIFICANT CHANGE UP (ref 0–0.2)
BASOPHILS NFR BLD AUTO: 0.1 % — SIGNIFICANT CHANGE UP (ref 0–2)
EOSINOPHIL # BLD AUTO: 0.01 K/UL — SIGNIFICANT CHANGE UP (ref 0–0.5)
EOSINOPHIL NFR BLD AUTO: 0.1 % — SIGNIFICANT CHANGE UP (ref 0–6)
HCT VFR BLD CALC: 28.3 % — LOW (ref 34.5–45)
HGB BLD-MCNC: 9.5 G/DL — LOW (ref 11.5–15.5)
IANC: 11.51 K/UL — HIGH (ref 1.8–7.4)
IMM GRANULOCYTES NFR BLD AUTO: 1.5 % — HIGH (ref 0–0.9)
LYMPHOCYTES # BLD AUTO: 1.54 K/UL — SIGNIFICANT CHANGE UP (ref 1–3.3)
LYMPHOCYTES # BLD AUTO: 10.7 % — LOW (ref 13–44)
MCHC RBC-ENTMCNC: 31.6 PG — SIGNIFICANT CHANGE UP (ref 27–34)
MCHC RBC-ENTMCNC: 33.6 GM/DL — SIGNIFICANT CHANGE UP (ref 32–36)
MCV RBC AUTO: 94 FL — SIGNIFICANT CHANGE UP (ref 80–100)
MONOCYTES # BLD AUTO: 1.15 K/UL — HIGH (ref 0–0.9)
MONOCYTES NFR BLD AUTO: 8 % — SIGNIFICANT CHANGE UP (ref 2–14)
NEUTROPHILS # BLD AUTO: 11.51 K/UL — HIGH (ref 1.8–7.4)
NEUTROPHILS NFR BLD AUTO: 79.6 % — HIGH (ref 43–77)
NRBC # BLD: 0 /100 WBCS — SIGNIFICANT CHANGE UP (ref 0–0)
NRBC # FLD: 0 K/UL — SIGNIFICANT CHANGE UP (ref 0–0)
PLATELET # BLD AUTO: 160 K/UL — SIGNIFICANT CHANGE UP (ref 150–400)
RBC # BLD: 3.01 M/UL — LOW (ref 3.8–5.2)
RBC # FLD: 12.4 % — SIGNIFICANT CHANGE UP (ref 10.3–14.5)
WBC # BLD: 14.45 K/UL — HIGH (ref 3.8–10.5)
WBC # FLD AUTO: 14.45 K/UL — HIGH (ref 3.8–10.5)

## 2024-04-21 RX ORDER — IBUPROFEN 200 MG
600 TABLET ORAL EVERY 6 HOURS
Refills: 0 | Status: DISCONTINUED | OUTPATIENT
Start: 2024-04-21 | End: 2024-04-23

## 2024-04-21 RX ADMIN — Medication 600 MILLIGRAM(S): at 18:35

## 2024-04-21 RX ADMIN — Medication 100 MILLIGRAM(S): at 23:54

## 2024-04-21 RX ADMIN — Medication 30 MILLIGRAM(S): at 11:21

## 2024-04-21 RX ADMIN — SIMETHICONE 80 MILLIGRAM(S): 80 TABLET, CHEWABLE ORAL at 20:34

## 2024-04-21 RX ADMIN — Medication 600 MILLIGRAM(S): at 18:01

## 2024-04-21 RX ADMIN — Medication 975 MILLIGRAM(S): at 20:12

## 2024-04-21 RX ADMIN — Medication 975 MILLIGRAM(S): at 20:45

## 2024-04-21 RX ADMIN — Medication 100 MILLIGRAM(S): at 11:20

## 2024-04-21 RX ADMIN — Medication 975 MILLIGRAM(S): at 08:51

## 2024-04-21 RX ADMIN — Medication 975 MILLIGRAM(S): at 15:18

## 2024-04-21 RX ADMIN — HEPARIN SODIUM 5000 UNIT(S): 5000 INJECTION INTRAVENOUS; SUBCUTANEOUS at 18:01

## 2024-04-21 RX ADMIN — Medication 975 MILLIGRAM(S): at 16:00

## 2024-04-21 RX ADMIN — Medication 30 MILLIGRAM(S): at 11:47

## 2024-04-21 RX ADMIN — Medication 975 MILLIGRAM(S): at 09:40

## 2024-04-21 RX ADMIN — Medication 30 MILLIGRAM(S): at 07:00

## 2024-04-21 RX ADMIN — HEPARIN SODIUM 5000 UNIT(S): 5000 INJECTION INTRAVENOUS; SUBCUTANEOUS at 06:14

## 2024-04-21 RX ADMIN — Medication 30 MILLIGRAM(S): at 06:12

## 2024-04-22 ENCOUNTER — TRANSCRIPTION ENCOUNTER (OUTPATIENT)
Age: 29
End: 2024-04-22

## 2024-04-22 RX ORDER — FERROUS SULFATE 325(65) MG
325 TABLET ORAL AT BEDTIME
Refills: 0 | Status: DISCONTINUED | OUTPATIENT
Start: 2024-04-22 | End: 2024-04-23

## 2024-04-22 RX ORDER — IBUPROFEN 200 MG
1 TABLET ORAL
Qty: 0 | Refills: 0 | DISCHARGE
Start: 2024-04-22

## 2024-04-22 RX ORDER — ONDANSETRON 8 MG/1
4 TABLET, FILM COATED ORAL ONCE
Refills: 0 | Status: COMPLETED | OUTPATIENT
Start: 2024-04-22 | End: 2024-04-22

## 2024-04-22 RX ORDER — ASCORBIC ACID 60 MG
500 TABLET,CHEWABLE ORAL DAILY
Refills: 0 | Status: DISCONTINUED | OUTPATIENT
Start: 2024-04-22 | End: 2024-04-23

## 2024-04-22 RX ORDER — ACETAMINOPHEN 500 MG
3 TABLET ORAL
Qty: 0 | Refills: 0 | DISCHARGE
Start: 2024-04-22

## 2024-04-22 RX ADMIN — Medication 100 MILLIGRAM(S): at 22:01

## 2024-04-22 RX ADMIN — ONDANSETRON 4 MILLIGRAM(S): 8 TABLET, FILM COATED ORAL at 20:29

## 2024-04-22 RX ADMIN — HEPARIN SODIUM 5000 UNIT(S): 5000 INJECTION INTRAVENOUS; SUBCUTANEOUS at 17:52

## 2024-04-22 RX ADMIN — Medication 500 MILLIGRAM(S): at 14:56

## 2024-04-22 RX ADMIN — Medication 600 MILLIGRAM(S): at 05:56

## 2024-04-22 RX ADMIN — HEPARIN SODIUM 5000 UNIT(S): 5000 INJECTION INTRAVENOUS; SUBCUTANEOUS at 05:56

## 2024-04-22 RX ADMIN — Medication 600 MILLIGRAM(S): at 06:29

## 2024-04-22 RX ADMIN — Medication 600 MILLIGRAM(S): at 17:51

## 2024-04-22 RX ADMIN — Medication 975 MILLIGRAM(S): at 08:35

## 2024-04-22 RX ADMIN — SIMETHICONE 80 MILLIGRAM(S): 80 TABLET, CHEWABLE ORAL at 20:30

## 2024-04-22 RX ADMIN — Medication 975 MILLIGRAM(S): at 14:56

## 2024-04-22 RX ADMIN — Medication 975 MILLIGRAM(S): at 09:29

## 2024-04-22 NOTE — PROVIDER CONTACT NOTE (OTHER) - ASSESSMENT
0100 u/o 75 mL and urine appears concentrated. MD Kennedi at bedside, aware.
Pt ambulating well. denies dizziness, no shortness of breath. Pt verbalized not eating much throughout the day.
0300 u/o 10 mL dark, blood tinged

## 2024-04-22 NOTE — PROVIDER CONTACT NOTE (OTHER) - RECOMMENDATIONS
pt. to receive 500cc bolus of LR
Pt encouraged to reposition, ginger ale offered, pt denies at this time.
pt. receive 1L bolus LR

## 2024-04-22 NOTE — PROVIDER CONTACT NOTE (OTHER) - ACTION/TREATMENT ORDERED:
Zofran 4mg iv push to be ordered and administered.
Pt. receiving 1L bolus LR, will continue to monitor u/o and urine concentration
Pt. receiving 500cc bolus of LR per MD

## 2024-04-22 NOTE — PROVIDER CONTACT NOTE (OTHER) - SITUATION
Pt complaining of feeling nauseous with no relief from repositioning, hot tea, water.
0300 u/o 10 mL; dark, blood tinged urine. pt. received 500cc bolus @0200; catheter was flushed
0100 u/o 75 mL and urine appears concentrated. MD Kennedi at bedside, aware.

## 2024-04-22 NOTE — PROVIDER CONTACT NOTE (OTHER) - BACKGROUND
EDC  GA 38.3   IOL CHTH SiPEC w/o severe features   VE /-1
Primary C/S 4/20 @11:30am CHTN SiPEC w/o severe features
 EDC 5/ GA 38.3   7.5/90/-2  BMI 47  IOL CHTN SiPEC W/O severe features   currently receiving 125 mL/hr

## 2024-04-23 VITALS
RESPIRATION RATE: 18 BRPM | SYSTOLIC BLOOD PRESSURE: 118 MMHG | TEMPERATURE: 98 F | OXYGEN SATURATION: 98 % | DIASTOLIC BLOOD PRESSURE: 71 MMHG | HEART RATE: 100 BPM

## 2024-04-23 RX ADMIN — Medication 975 MILLIGRAM(S): at 09:53

## 2024-04-23 RX ADMIN — Medication 600 MILLIGRAM(S): at 06:52

## 2024-04-23 RX ADMIN — Medication 100 MILLIGRAM(S): at 09:53

## 2024-04-23 RX ADMIN — Medication 975 MILLIGRAM(S): at 10:45

## 2024-04-23 RX ADMIN — Medication 600 MILLIGRAM(S): at 06:20

## 2024-04-23 RX ADMIN — HEPARIN SODIUM 5000 UNIT(S): 5000 INJECTION INTRAVENOUS; SUBCUTANEOUS at 06:20

## 2024-04-23 NOTE — PROGRESS NOTE ADULT - SUBJECTIVE AND OBJECTIVE BOX
OB Progress Note:  Delivery, POD#1    S: 30yo POD#1 s/p pLTCS due to AODescent. Pain well controlled, tolerating regular diet, not yet passing flatus, ambulating without difficulty, voiding spontaneously. Denies heavy vaginal bleeding, N/V, CP/SOB/lightheadedness/dizziness.     O:   Vital Signs Last 24 Hrs  T(C): 37.1 (2024 02:02), Max: 38.1 (2024 17:26)  T(F): 98.8 (2024 02:02), Max: 100.5 (2024 17:26)  HR: 93 (2024 02:02) (66 - 210)  BP: 99/55 (2024 02:02) (91/71 - 129/72)  BP(mean): 75 (2024 15:00) (65 - 94)  RR: 17 (2024 02:02) (16 - 27)  SpO2: 97% (2024 02:02) (76% - 100%)    Parameters below as of 2024 02:02  Patient On (Oxygen Delivery Method): room air        Labs:  Blood type: A Positive  Rubella IgG: RPR: Negative                          10.5<L>   8.40 >-----------< 158    (  @ 01:48 )             31.5<L>    24 @ 01:48      135  |  103  |  5<L>  ----------------------------<  80  3.4<L>   |  17<L>  |  0.47<L>        Ca    8.5      2024 01:48    TPro  5.9<L>  /  Alb  3.3  /  TBili  1.2  /  DBili  x   /  AST  21  /  ALT  27  /  AlkPhos  147<H>  24 @ 01:48          PE:  General: NAD  CV: RRR  Resp: CTAB  Abdomen: Mildly distended, appropriately tender, Fundus firm, Aquacel dressing overlaying incision, no saturation of dressing, dressing dry  : Nl lochia  Extremities: No erythema, no pitting edema    
INTERVAL HPI/OVERNIGHT EVENTS: Pt seen and examined at bedside.  Adequate PO pain control. +flatus. +voiding without difficulty, +ambulation, erik reg diet.    MEDICATIONS  (STANDING):  acetaminophen     Tablet .. 975 milliGRAM(s) Oral <User Schedule>  ascorbic acid 500 milliGRAM(s) Oral daily  diphtheria/tetanus/pertussis (acellular) Vaccine (Adacel) 0.5 milliLiter(s) IntraMuscular once  ferrous    sulfate 325 milliGRAM(s) Oral at bedtime  heparin   Injectable 5000 Unit(s) SubCutaneous every 12 hours  ibuprofen  Tablet. 600 milliGRAM(s) Oral every 6 hours  labetalol 100 milliGRAM(s) Oral every 12 hours    MEDICATIONS  (PRN):  diphenhydrAMINE 25 milliGRAM(s) Oral every 6 hours PRN Pruritus  lanolin Ointment 1 Application(s) Topical every 6 hours PRN Sore Nipples  magnesium hydroxide Suspension 30 milliLiter(s) Oral two times a day PRN Constipation  oxyCODONE    IR 5 milliGRAM(s) Oral every 3 hours PRN Moderate to Severe Pain (4-10)  oxyCODONE    IR 5 milliGRAM(s) Oral once PRN Moderate to Severe Pain (4-10)  simethicone 80 milliGRAM(s) Chew every 4 hours PRN Gas      Vital Signs Last 24 Hrs  T(C): 37 (23 Apr 2024 06:25), Max: 37 (23 Apr 2024 06:25)  T(F): 98.6 (23 Apr 2024 06:25), Max: 98.6 (23 Apr 2024 06:25)  HR: 92 (23 Apr 2024 06:25) (85 - 103)  BP: 122/85 (23 Apr 2024 06:25) (98/63 - 126/86)  BP(mean): --  RR: 18 (23 Apr 2024 06:25) (18 - 18)  SpO2: 99% (23 Apr 2024 06:25) (99% - 100%)    Parameters below as of 23 Apr 2024 06:25  Patient On (Oxygen Delivery Method): room air        PHYSICAL EXAM:    GA: NAD, A+0 x 3  Abd: ( + ) BS, soft, nontender, nondistended, no rebound or guarding,   Uterus: Fundus midline; firm  Incision: aquacel in place    LABS:                  RADIOLOGY & ADDITIONAL TESTS:

## 2024-04-23 NOTE — PROGRESS NOTE ADULT - ASSESSMENT
Patient seen at bedside resting comfortably offers no new complaints. + Ambulation, + void without difficulty, + flatus;  no bm; tolerating regular diet. both breastfeeding and bottle feeding. Denies HA, blurry vision or epigastric pain, CP, SOB, N/V/D,  dizziness, palpitations, worsening vaginal bleeding.    Vital Signs Last 24 Hrs  T(C): 36.8 (22 Apr 2024 10:05), Max: 37.2 (21 Apr 2024 18:11)  T(F): 98.2 (22 Apr 2024 10:05), Max: 98.9 (21 Apr 2024 18:11)  HR: 103 (22 Apr 2024 10:05) (63 - 104)  BP: 98/63 (22 Apr 2024 10:05) (95/55 - 119/77)  BP(mean): --  RR: 18 (22 Apr 2024 10:05) (18 - 18)  SpO2: 99% (22 Apr 2024 10:05) (96% - 99%)    Parameters below as of 22 Apr 2024 10:05  Patient On (Oxygen Delivery Method): room air        Gen: A&O x 3, NAD  Chest: CTABL  Cardiac: S1, S2, RRR  Breast: Soft, nontender, nonengorged  Abdomen: +BS; soft; Nontender, nondistended, Incision C/D/I steri strips in place   Gyn: Minimal lochia  Extremities: Nontender, DTRS 2+, no worsening edema                          9.5    14.45 )-----------( 160      ( 21 Apr 2024 06:24 )             28.3       A/P: 29yr old F POD #2 s/p p/c/s on 4/20/2024 for arrest of decent, siPEC w/o SF.     #CHTN --> SiPEC w.o SF   - Overnight BPs 90-100s/60-70s  - HELLP Labs- WNL   - P/C Ratio 0.2  - Labetalol 100mg BID on board   - Pt has BP cuff at home   - PEC resources provided     #PP    - Pain management as needed  - Cont post op care  - OOB and ambulate  - Incision C/D/I   - Incision care discussed   - Encourage breastfeeding     -d/w dr Edi Rogers NP 
A/P: 28yo POD#1 s/p pLTCS due to AODescent. Patient is stable and doing well post-operatively.    - Continue regular diet  - Increase ambulation  - Continue motrin, tylenol, oxycodone PRN for pain control.    - f/u AM CBC    Andriy Guerrero MD PGY1
POD3 PCS for arrest of descent  -meeting all post op goals and ready for discharge  -instructions given on follow up regarding dressing    CHTN cb siPEC w/o SF  -continue with labetolol 100mhg BID at home  -foillow up in 3 days for BP check  -parameters given, has BP cuff at home

## 2024-04-26 ENCOUNTER — EMERGENCY (EMERGENCY)
Facility: HOSPITAL | Age: 29
LOS: 1 days | Discharge: HOME | End: 2024-04-26
Attending: EMERGENCY MEDICINE | Admitting: EMERGENCY MEDICINE
Payer: MEDICAID

## 2024-04-26 VITALS
HEART RATE: 87 BPM | RESPIRATION RATE: 18 BRPM | OXYGEN SATURATION: 99 % | TEMPERATURE: 98 F | DIASTOLIC BLOOD PRESSURE: 95 MMHG | SYSTOLIC BLOOD PRESSURE: 141 MMHG

## 2024-04-26 VITALS
HEART RATE: 94 BPM | RESPIRATION RATE: 18 BRPM | TEMPERATURE: 98 F | SYSTOLIC BLOOD PRESSURE: 134 MMHG | DIASTOLIC BLOOD PRESSURE: 91 MMHG | OXYGEN SATURATION: 99 % | HEIGHT: 63 IN

## 2024-04-26 DIAGNOSIS — Z90.49 ACQUIRED ABSENCE OF OTHER SPECIFIED PARTS OF DIGESTIVE TRACT: Chronic | ICD-10-CM

## 2024-04-26 PROBLEM — I10 ESSENTIAL (PRIMARY) HYPERTENSION: Chronic | Status: ACTIVE | Noted: 2024-04-17

## 2024-04-26 LAB
ALBUMIN SERPL ELPH-MCNC: 2.6 G/DL — LOW (ref 3.3–5)
ALP SERPL-CCNC: 81 U/L — SIGNIFICANT CHANGE UP (ref 40–120)
ALT FLD-CCNC: 13 U/L — SIGNIFICANT CHANGE UP (ref 4–33)
ANION GAP SERPL CALC-SCNC: 12 MMOL/L — SIGNIFICANT CHANGE UP (ref 7–14)
ANISOCYTOSIS BLD QL: SLIGHT — SIGNIFICANT CHANGE UP
AST SERPL-CCNC: 10 U/L — SIGNIFICANT CHANGE UP (ref 4–32)
BASOPHILS # BLD AUTO: 0 K/UL — SIGNIFICANT CHANGE UP (ref 0–0.2)
BASOPHILS NFR BLD AUTO: 0 % — SIGNIFICANT CHANGE UP (ref 0–2)
BILIRUB SERPL-MCNC: 0.4 MG/DL — SIGNIFICANT CHANGE UP (ref 0.2–1.2)
BLD GP AB SCN SERPL QL: NEGATIVE — SIGNIFICANT CHANGE UP
BUN SERPL-MCNC: 5 MG/DL — LOW (ref 7–23)
CALCIUM SERPL-MCNC: 7.8 MG/DL — LOW (ref 8.4–10.5)
CHLORIDE SERPL-SCNC: 107 MMOL/L — SIGNIFICANT CHANGE UP (ref 98–107)
CO2 SERPL-SCNC: 22 MMOL/L — SIGNIFICANT CHANGE UP (ref 22–31)
CREAT SERPL-MCNC: 0.54 MG/DL — SIGNIFICANT CHANGE UP (ref 0.5–1.3)
EGFR: 128 ML/MIN/1.73M2 — SIGNIFICANT CHANGE UP
EOSINOPHIL # BLD AUTO: 0.21 K/UL — SIGNIFICANT CHANGE UP (ref 0–0.5)
EOSINOPHIL NFR BLD AUTO: 2.5 % — SIGNIFICANT CHANGE UP (ref 0–6)
GIANT PLATELETS BLD QL SMEAR: PRESENT — SIGNIFICANT CHANGE UP
GLUCOSE SERPL-MCNC: 85 MG/DL — SIGNIFICANT CHANGE UP (ref 70–99)
HCG SERPL-ACNC: 30.2 MIU/ML — SIGNIFICANT CHANGE UP
HCT VFR BLD CALC: 26.1 % — LOW (ref 34.5–45)
HGB BLD-MCNC: 8.7 G/DL — LOW (ref 11.5–15.5)
HYPOCHROMIA BLD QL: SLIGHT — SIGNIFICANT CHANGE UP
IANC: 5.51 K/UL — SIGNIFICANT CHANGE UP (ref 1.8–7.4)
LIDOCAIN IGE QN: 37 U/L — SIGNIFICANT CHANGE UP (ref 7–60)
LYMPHOCYTES # BLD AUTO: 0.66 K/UL — LOW (ref 1–3.3)
LYMPHOCYTES # BLD AUTO: 7.7 % — LOW (ref 13–44)
MCHC RBC-ENTMCNC: 31.2 PG — SIGNIFICANT CHANGE UP (ref 27–34)
MCHC RBC-ENTMCNC: 33.3 GM/DL — SIGNIFICANT CHANGE UP (ref 32–36)
MCV RBC AUTO: 93.5 FL — SIGNIFICANT CHANGE UP (ref 80–100)
METAMYELOCYTES # FLD: 2.6 % — HIGH (ref 0–1)
MICROCYTES BLD QL: SLIGHT — SIGNIFICANT CHANGE UP
MONOCYTES # BLD AUTO: 0.58 K/UL — SIGNIFICANT CHANGE UP (ref 0–0.9)
MONOCYTES NFR BLD AUTO: 6.8 % — SIGNIFICANT CHANGE UP (ref 2–14)
MYELOCYTES NFR BLD: 2.6 % — HIGH (ref 0–0)
NEUTROPHILS # BLD AUTO: 6.63 K/UL — SIGNIFICANT CHANGE UP (ref 1.8–7.4)
NEUTROPHILS NFR BLD AUTO: 76.1 % — SIGNIFICANT CHANGE UP (ref 43–77)
NEUTS BAND # BLD: 1.7 % — SIGNIFICANT CHANGE UP (ref 0–6)
PLAT MORPH BLD: NORMAL — SIGNIFICANT CHANGE UP
PLATELET # BLD AUTO: 274 K/UL — SIGNIFICANT CHANGE UP (ref 150–400)
PLATELET COUNT - ESTIMATE: NORMAL — SIGNIFICANT CHANGE UP
POLYCHROMASIA BLD QL SMEAR: SLIGHT — SIGNIFICANT CHANGE UP
POTASSIUM SERPL-MCNC: 2.8 MMOL/L — CRITICAL LOW (ref 3.5–5.3)
POTASSIUM SERPL-SCNC: 2.8 MMOL/L — CRITICAL LOW (ref 3.5–5.3)
PROT SERPL-MCNC: 5.4 G/DL — LOW (ref 6–8.3)
RBC # BLD: 2.79 M/UL — LOW (ref 3.8–5.2)
RBC # FLD: 11.9 % — SIGNIFICANT CHANGE UP (ref 10.3–14.5)
RBC BLD AUTO: ABNORMAL
RH IG SCN BLD-IMP: POSITIVE — SIGNIFICANT CHANGE UP
SODIUM SERPL-SCNC: 141 MMOL/L — SIGNIFICANT CHANGE UP (ref 135–145)
WBC # BLD: 8.52 K/UL — SIGNIFICANT CHANGE UP (ref 3.8–10.5)
WBC # FLD AUTO: 8.52 K/UL — SIGNIFICANT CHANGE UP (ref 3.8–10.5)

## 2024-04-26 PROCEDURE — 93010 ELECTROCARDIOGRAM REPORT: CPT

## 2024-04-26 PROCEDURE — 99285 EMERGENCY DEPT VISIT HI MDM: CPT

## 2024-04-26 RX ORDER — MAGNESIUM SULFATE 500 MG/ML
1 VIAL (ML) INJECTION ONCE
Refills: 0 | Status: DISCONTINUED | OUTPATIENT
Start: 2024-04-26 | End: 2024-04-26

## 2024-04-26 RX ORDER — POTASSIUM CHLORIDE 20 MEQ
40 PACKET (EA) ORAL ONCE
Refills: 0 | Status: COMPLETED | OUTPATIENT
Start: 2024-04-26 | End: 2024-04-26

## 2024-04-26 RX ORDER — POTASSIUM CHLORIDE 20 MEQ
10 PACKET (EA) ORAL
Refills: 0 | Status: COMPLETED | OUTPATIENT
Start: 2024-04-26 | End: 2024-04-26

## 2024-04-26 RX ORDER — MAGNESIUM SULFATE 500 MG/ML
1 VIAL (ML) INJECTION ONCE
Refills: 0 | Status: COMPLETED | OUTPATIENT
Start: 2024-04-26 | End: 2024-04-26

## 2024-04-26 RX ADMIN — Medication 100 MILLIEQUIVALENT(S): at 20:44

## 2024-04-26 RX ADMIN — Medication 100 MILLIEQUIVALENT(S): at 23:32

## 2024-04-26 RX ADMIN — Medication 40 MILLIEQUIVALENT(S): at 23:42

## 2024-04-26 RX ADMIN — Medication 40 MILLIEQUIVALENT(S): at 20:43

## 2024-04-26 RX ADMIN — Medication 100 MILLIEQUIVALENT(S): at 22:27

## 2024-04-26 NOTE — CONSULT NOTE ADULT - ASSESSMENT
Incision opened up at the bedside to a total length of 12cm w/ serous fluid   - Patient to follow up in office in x2-3 days   - Approriate for dc from GYN standpoint after hypokalemia is addressed     Ricardo Langston  PGY-2, Obstetrics & Gynecology      Incision opened up at the bedside to a total length of 12cm w/ serous fluid   - Patient to follow up in office in x2-3 days   - Appropriate for dc from GYN standpoint after hypokalemia is addressed    Ricardo Langston  PGY-2, Obstetrics & Gynecology  28yo POD#6 from pcs for arrest of descent who presents for yellow watery drainage from her incision consistent with seroma that had lead to superficial wound separation. There were no signs to suggest infection (no fever, leukocytosis, purulent drainage, or adjacent skin changes). Given the seroma/exam, patient was counseled that recommendation would be to open up the incision at the bedside with plans to establish wound care thereafter. Patient was incidentally found to be hypokalemic which was addressed by our ED colleagues     Incision opened up at the bedside to a total length of 12cm w/ straw-colored fluid after it was infiltrated w/ 1% Lidocaine. Wound was copiously irrigated with normal saline after the incision was cut with scissors. Patient overall tolerated the procedure well   - Patient to follow up in office in x2-3 days   - Appropriate for dc from GYN standpoint after hypokalemia is addressed    Ricardo Langston  PGY-2, Obstetrics & Gynecology     seen and evaluated w/ Dr. MOY Monge

## 2024-04-26 NOTE — ED PROVIDER NOTE - CLINICAL SUMMARY MEDICAL DECISION MAKING FREE TEXT BOX
29-year-old female past medical history of hypertension status post  2024 by Dr. Bullard sent in from clinic for concern for wound infection.  Vital signs stable, afebrile.  Exam nontoxic-appearing, and nontender.  Incision with small area of superficial dehiscence at midline, serous drainage.  Will send labs, GYN consult.  Disposition pending.

## 2024-04-26 NOTE — ED ADULT NURSE NOTE - CHIEF COMPLAINT QUOTE
Pt A&OX4 c/o serous drainage from  suture line, was seen in clinic today and told to come to ER to r/o infection, pt afebrile, denies any chills ro fever at home;   was performed here at [facility] 24  Pt is nursing advised that we have breast pumps avail if needed

## 2024-04-26 NOTE — ED PROVIDER NOTE - PHYSICAL EXAMINATION
GEN:  Non-toxic appearing, non-distressed, speaking full sentences, non-diaphoretic, AAOx3  HEENT:  NCAT, neck supple, EOMI, PERRLA, sclera anicteric, no conjunctival pallor or injection, no stridor, normal voice, no tonsillar exudate, uvula midline  CV:  regular rhythm and rate, s1/s2 audible, no murmurs, rubs or gallops, peripheral pulses 2+ and symmetric  PULM:  non-labored respirations, lungs clear to auscultation bilaterally, no wheezes, crackles or rales  ABD:  non distended, non-tender, no rebound, no guarding, negative Carmichael's sign, bowel sounds normal, no cvat  MSK:  no gross deformity, non-tender extremities and joints, range of motion grossly normal appearing, no extremity edema, extremities warm and well perfused   NEURO:  AAOx3, CN II-XII intact, motor 5/5 in upper and lower extremities bilaterally, sensation grossly intact in extremities and trunk, no gait deficit  SKIN:   incision without erythema, tenderness, swelling; subcentimeter area of superficial wound dehiscence at midline, scant serious drainage, no malodor

## 2024-04-26 NOTE — ED PROVIDER NOTE - OBJECTIVE STATEMENT
29-year-old female (Panamanian speaking,  [id number]) past medical history of hypertension status post  2024 by Dr. Bullard sent in from clinic for concern for wound infection.  Patient states that she is having clear drainage from the midline of the  incision.  Saw OB/GYN today who sent her to the ED for concern for infection.  Patient denies fever, chills, nausea, vomiting, abdominal pain, purulent discharge or bleeding.  States she is having scant vaginal bleeding.  No other complaints.

## 2024-04-26 NOTE — ED ADULT TRIAGE NOTE - CHIEF COMPLAINT QUOTE
Pt A&OX4 c/o serous drainage from  suture line, was seen in clinic today and told to come to ER to r/o infection, pt afebrile, denies any chills ro fever at home;   was performed here at [facility] 24  Pt is nursing advised that we have breast pumps avail if needed

## 2024-04-26 NOTE — CONSULT NOTE ADULT - SUBJECTIVE AND OBJECTIVE BOX
JOSE LYNN  29y  Female [id number]    HPI: 30yo POD6 from pcs for arrest of descent who presents for drainage from her incision      Denies any chest pain, shortness of breath, fevers, chills, n/v, light-headedness, dizziness, abnormal vaginal discharge, significant vaginal bleeding, dysuria, issues with bowel movements, or any other complaints     Name of Ob/Gyn Physician: [facility]    POB:    PGyn: Denies  MedHx:  SurgHx:  Meds:  Allergies:  Social: Denies any tobacco use, drug use, or alcohol use     Home meds:   Allergies    No Known Allergies    Intolerances        Hospital Meds:   MEDICATIONS  (STANDING):  magnesium sulfate Injectable 1 Gram(s) IV Push once  potassium chloride  10 mEq/100 mL IVPB 10 milliEquivalent(s) IV Intermittent every 1 hour    MEDICATIONS  (PRN):    PAST MEDICAL & SURGICAL HISTORY:  Chronic hypertension      History of appendectomy        FAMILY HISTORY:      Vital Signs Last 24 Hrs  T(C): 36.9 (26 Apr 2024 19:06), Max: 36.9 (26 Apr 2024 19:06)  T(F): 98.4 (26 Apr 2024 19:06), Max: 98.4 (26 Apr 2024 19:06)  HR: 82 (26 Apr 2024 19:06) (82 - 94)  BP: 135/90 (26 Apr 2024 19:06) (134/91 - 135/90)  BP(mean): --  RR: 16 (26 Apr 2024 19:06) (16 - 18)  SpO2: 99% (26 Apr 2024 19:06) (99% - 99%)    Parameters below as of 26 Apr 2024 19:06  Patient On (Oxygen Delivery Method): room air        Physical Exam:   General: Awake. Alert. No acute distress   CV: Regular rate and rhythm. No murmurs appreciated   Lungs: Clear to auscultation bilaterally. Unlabored breathing. No respiratory distress   Back: No CVA tenderness  Abd: Soft, non-tender, and non-distended. No pain elicted w/ jostling of stretcher    (w/ Chaperone ):  External vulva and labia w/o lesions or skin changes seen.  Bimanual exam with no cervical motion tenderness, uterus wnl, adnexa non palpable b/l.  Cervix closed vs. Cervix dilated    cm   Speculum Exam: No active bleeding from os. Physiologic discharge.    Ext: No calf tenderness  bilaterally    LABS:                              8.7    8.52  )-----------( 274      ( 26 Apr 2024 18:15 )             26.1     04-26    141  |  107  |  5<L>  ----------------------------<  85  2.8<LL>   |  22  |  0.54    Ca    7.8<L>      26 Apr 2024 18:15    TPro  5.4<L>  /  Alb  2.6<L>  /  TBili  0.4  /  DBili  x   /  AST  10  /  ALT  13  /  AlkPhos  81  04-26    I&O's Detail      Urinalysis Basic - ( 26 Apr 2024 18:15 )    Color: x / Appearance: x / SG: x / pH: x  Gluc: 85 mg/dL / Ketone: x  / Bili: x / Urobili: x   Blood: x / Protein: x / Nitrite: x   Leuk Esterase: x / RBC: x / WBC x   Sq Epi: x / Non Sq Epi: x / Bacteria: x        RADIOLOGY & ADDITIONAL STUDIES: JOSE LYNN  29y  Female [id number] (Entry delayed secondary to clinical duties)   -  # [id number]    HPI: 30yo POD#6 from [facility] for arrest of descent who presents for yellow watery drainage from her incision that started last night. She was concerned that she may have an underlying infection. She says that her postpartum/post-op course was otherwise overall uncomplicated. Patient denies any chest pain, shortness of breath, fevers, chills, abdominal pain, heavy vaginal bleeding, or any other complaints. Describes otherwise meeting post-operative milestones. Denies any headaches, scotomas, or any other complaints     Patient was induced on  at 38w0d for superimposed preeclampsia without severe features. She underwent  on  for arrest of descent. She was discharged on 2024    Name of Ob/Gyn Physician: [facility]    POB: Above and sAb x2   PGyn: Denies  MedHx: cHTN  SurgHx: Above, Appendectomy (2018)  Meds: OTC analgesics, Labetalol   Allergies: NKDA  Social: Denies any tobacco use, drug use, or alcohol use     Hospital Meds:   MEDICATIONS  (STANDING):  magnesium sulfate Injectable 1 Gram(s) IV Push once  potassium chloride  10 mEq/100 mL IVPB 10 milliEquivalent(s) IV Intermittent every 1 hour    Vital Signs Last 24 Hrs  T(C): 36.9 (2024 19:06), Max: 36.9 (2024 19:06)  T(F): 98.4 (2024 19:06), Max: 98.4 (2024 19:06)  HR: 82 (2024 19:06) (82 - 94)  BP: 135/90 (2024 19:06) (134/91 - 135/90)  BP(mean): --  RR: 16 (2024 19:06) (16 - 18)  SpO2: 99% (2024 19:06) (99% - 99%)    Parameters below as of 2024 19:06  Patient On (Oxygen Delivery Method): room air        Physical Exam:   General: Awake. Alert. No acute distress   Lungs: Unlabored breathing. No respiratory distress   Abd: Soft, non-tender, and non-distended  - Incision w/ noted area of separation in the midline, ~1cm in length. Upon probing with q-tip, straw colored fluid noted to immediately come from the incision. No purulent discharge from the incision or any erythema around the incision itself. No warmth from the touch   Ext: No calf tenderness bilaterally    LABS:                              8.7    8.52  )-----------( 274      ( 2024 18:15 )             26.1         141  |  107  |  5<L>  ----------------------------<  85  2.8<LL>   |  22  |  0.54    Ca    7.8<L>      2024 18:15    TPro  5.4<L>  /  Alb  2.6<L>  /  TBili  0.4  /  DBili  x   /  AST  10  /  ALT  13  /  AlkPhos  81      I&O's Detail      Urinalysis Basic - ( 2024 18:15 )    Color: x / Appearance: x / SG: x / pH: x  Gluc: 85 mg/dL / Ketone: x  / Bili: x / Urobili: x   Blood: x / Protein: x / Nitrite: x   Leuk Esterase: x / RBC: x / WBC x   Sq Epi: x / Non Sq Epi: x / Bacteria: x        RADIOLOGY & ADDITIONAL STUDIES:

## 2024-04-26 NOTE — ED ADULT NURSE NOTE - NSFALLUNIVINTERV_ED_ALL_ED
Bed/Stretcher in lowest position, wheels locked, appropriate side rails in place/Call bell, personal items and telephone in reach/Instruct patient to call for assistance before getting out of bed/chair/stretcher/Non-slip footwear applied when patient is off stretcher/[place] to call system/Physically safe environment - no spills, clutter or unnecessary equipment/Purposeful proactive rounding/Room/bathroom lighting operational, light cord in reach

## 2024-04-26 NOTE — ED ADULT NURSE NOTE - OBJECTIVE STATEMENT
Pt 28 y/o female seen in ER for c/o drainage from surgical wound starting last night. Pt s/p  on 24. Pt noted with serous like drainage from transverse abdominal wound . No foul odor noted .  No redness noted to periwound area. Wound edges approximated.  Pt denies pain , discomfort fever or chills . Vietnamese   used for assessment.

## 2024-04-26 NOTE — ED PROVIDER NOTE - NSFOLLOWUPINSTRUCTIONS_ED_ALL_ED_FT
Please follow up with your OB/GYN.    Return to the emergency department as needed for any symptoms of concern.

## 2024-04-26 NOTE — ED PROVIDER NOTE - PATIENT PORTAL LINK FT
You can access the FollowMyHealth Patient Portal offered by [facility] by registering at the following website: [email or web address]. By joining [employer]’s FollowMyHealth portal, you will also be able to view your health information using other applications (apps) compatible with our system.

## 2024-04-26 NOTE — CONSULT NOTE ADULT - ATTENDING COMMENTS
POD #7 from P'C/S with seroma. Straw colored drainage noted. Non erythematous or warm to touch. Incision was probed with a sterile qtip. Fascia noted to be intake.   Incision opened at beside with permission from patient. Patient received 1% Lidocaine without epi. Incision opened up with scissors. Subcutaneous layer was copiously irrigated with normal saline. Incision was packed and covered with an abdominal binder.     Discussed with patient that it may take up to 3 weeks for incision to heal and reapproximate. Recommend patient follow up in the office in 2-3 days for a dressing change. Patient will need to come to the office 2-3x a week for dressing change unless patient feels comfortable doing it herself at home.  Continue to take Ibuprofen or Tylenol for pain management  Reached out to [facility] Call center so patient will receive a phone call tomorrow with an appointment  All questions and concerns were addressed to the patient's satisfaction.

## 2024-04-26 NOTE — ED PROVIDER NOTE - PROGRESS NOTE DETAILS
Harley:  patient signed out to me pending electrolyte repletion and repeat BMP.  Potassium improved, will discharge home.

## 2024-04-27 LAB
ANION GAP SERPL CALC-SCNC: 10 MMOL/L — SIGNIFICANT CHANGE UP (ref 7–14)
BUN SERPL-MCNC: 5 MG/DL — LOW (ref 7–23)
CALCIUM SERPL-MCNC: 7.8 MG/DL — LOW (ref 8.4–10.5)
CHLORIDE SERPL-SCNC: 108 MMOL/L — HIGH (ref 98–107)
CO2 SERPL-SCNC: 22 MMOL/L — SIGNIFICANT CHANGE UP (ref 22–31)
CREAT SERPL-MCNC: 0.5 MG/DL — SIGNIFICANT CHANGE UP (ref 0.5–1.3)
EGFR: 130 ML/MIN/1.73M2 — SIGNIFICANT CHANGE UP
GLUCOSE SERPL-MCNC: 101 MG/DL — HIGH (ref 70–99)
MAGNESIUM SERPL-MCNC: 1.9 MG/DL — SIGNIFICANT CHANGE UP (ref 1.6–2.6)
PHOSPHATE SERPL-MCNC: 2.5 MG/DL — SIGNIFICANT CHANGE UP (ref 2.5–4.5)
POTASSIUM SERPL-MCNC: 3.4 MMOL/L — LOW (ref 3.5–5.3)
POTASSIUM SERPL-SCNC: 3.4 MMOL/L — LOW (ref 3.5–5.3)
SODIUM SERPL-SCNC: 140 MMOL/L — SIGNIFICANT CHANGE UP (ref 135–145)

## 2024-04-27 RX ADMIN — Medication 100 GRAM(S): at 00:51

## 2024-05-24 NOTE — OB NEONATOLOGY/PEDIATRICIAN DELIVERY SUMMARY - NSSUCTIONBYPEDSA_OBGYN_ALL_OB
Anesthesia Start and Stop Event Times       Date Time Event    5/24/2024 1154 Ready for Procedure     1208 Anesthesia Start     1230 Anesthesia Stop          Responsible Staff  05/24/24      Name Role Begin End    Elpidio Gregg M.D. Anesth 1208 1230          Overtime Reason:  no overtime (within assigned shift)    Comments:                                                          
Mouth/Nose/Pharynx

## 2024-06-09 LAB — SURGICAL PATHOLOGY STUDY: SIGNIFICANT CHANGE UP

## 2024-08-26 NOTE — OB PROVIDER H&P - ABORTIONS, OB PROFILE
2 - 3 months with psa  there is one scheduled by Dr. Zamora in early november  i also put in an order 2

## (undated) DEVICE — DRSG DERMABOND PRINEO 22CM